# Patient Record
Sex: MALE | Race: WHITE | NOT HISPANIC OR LATINO | Employment: FULL TIME | URBAN - METROPOLITAN AREA
[De-identification: names, ages, dates, MRNs, and addresses within clinical notes are randomized per-mention and may not be internally consistent; named-entity substitution may affect disease eponyms.]

---

## 2021-03-03 DIAGNOSIS — Z23 NEED FOR VACCINATION: ICD-10-CM

## 2022-07-23 ENCOUNTER — OFFICE VISIT (OUTPATIENT)
Dept: URGENT CARE | Facility: CLINIC | Age: 72
End: 2022-07-23
Payer: COMMERCIAL

## 2022-07-23 VITALS
HEIGHT: 78 IN | SYSTOLIC BLOOD PRESSURE: 156 MMHG | TEMPERATURE: 97.7 F | WEIGHT: 261.6 LBS | RESPIRATION RATE: 16 BRPM | BODY MASS INDEX: 30.27 KG/M2 | OXYGEN SATURATION: 97 % | DIASTOLIC BLOOD PRESSURE: 74 MMHG | HEART RATE: 74 BPM

## 2022-07-23 DIAGNOSIS — S61.217A LACERATION OF LEFT LITTLE FINGER WITHOUT FOREIGN BODY WITHOUT DAMAGE TO NAIL, INITIAL ENCOUNTER: ICD-10-CM

## 2022-07-23 PROCEDURE — 12001 RPR S/N/AX/GEN/TRNK 2.5CM/<: CPT | Performed by: PHYSICIAN ASSISTANT

## 2022-07-23 PROCEDURE — 90714 TD VACC NO PRESV 7 YRS+ IM: CPT | Performed by: PHYSICIAN ASSISTANT

## 2022-07-23 PROCEDURE — 90471 IMMUNIZATION ADMIN: CPT | Performed by: PHYSICIAN ASSISTANT

## 2022-07-23 RX ORDER — LOSARTAN POTASSIUM AND HYDROCHLOROTHIAZIDE 12.5; 1 MG/1; MG/1
1 TABLET ORAL
COMMUNITY
Start: 2022-07-13

## 2022-07-23 RX ORDER — UBIDECARENONE 100 MG
CAPSULE ORAL DAILY
COMMUNITY

## 2022-07-23 RX ORDER — FLUTICASONE PROPIONATE 50 MCG
2 SPRAY, SUSPENSION (ML) NASAL DAILY
COMMUNITY
Start: 2022-07-13

## 2022-07-23 ASSESSMENT — FIBROSIS 4 INDEX: FIB4 SCORE: 0.87

## 2022-07-23 NOTE — PROGRESS NOTES
"Subjective:   Aleksandar Thorpe is a 71 y.o. male who presents for Laceration (Pt has a laceration on (L) pinky x today )      HPI  The patient presents to the Urgent Care with a laceration to his left fifth finger onset today.  He was opening up boxes with a clean pocket knife and accidentally cut his left pinky.  There is mild bleeding but controlled with compression.  He is unsure if this needs sutures or glue.  He is not sure if he is up-to-date on his tetanus.  There is pain but no numbness or tingling.  He has full range of motion of his finger.        Medications:    • Coenzyme Q10 Caps  • fluticasone  • losartan-hydrochlorothiazide  • OMEGA-3 FATTY ACIDS PO  • SELENIUM PO  • TURMERIC PO  • VALERIAN ROOT PO  • VITAMIN D (CHOLECALCIFEROL) PO    Allergies: Penicillins    Problem List: Aleksandar Thorpe does not have a problem list on file.    Surgical History:  No past surgical history on file.    Past Social Hx: Aleksandar Thorpe  reports that he has never smoked. He has never used smokeless tobacco. He reports previous alcohol use. He reports previous drug use.     Past Family Hx:  Aleksandar Thorpe family history is not on file.     Problem list, medications, and allergies reviewed by myself today in Epic.     Objective:     BP (!) 156/74 (BP Location: Right arm, Patient Position: Sitting, BP Cuff Size: Large adult)   Pulse 74   Temp 36.5 °C (97.7 °F) (Temporal)   Resp 16   Ht 1.981 m (6' 6\")   Wt 119 kg (261 lb 9.6 oz)   SpO2 97%   BMI 30.23 kg/m²     Physical Exam  Vitals reviewed.   Constitutional:       General: He is not in acute distress.     Appearance: Normal appearance. He is not ill-appearing or toxic-appearing.   Eyes:      Conjunctiva/sclera: Conjunctivae normal.      Pupils: Pupils are equal, round, and reactive to light.   Cardiovascular:      Rate and Rhythm: Normal rate.   Pulmonary:      Effort: Pulmonary effort is normal.   Musculoskeletal:        Hands:       Cervical back: " Neck supple.      Comments: Left 5th finger: Full ROM. Well approximated 1 cm linear superficial laceration not extending through dermis layer to the lateral 5th finger. No tendon involvement. Sensation intact. Cap refill < 2 sec.   Some gaping with finger flexion.    Skin:     General: Skin is warm.   Neurological:      General: No focal deficit present.      Mental Status: He is alert and oriented to person, place, and time.   Psychiatric:         Mood and Affect: Mood normal.         Behavior: Behavior normal.       Laceration Repair Procedure    Risks including bleeding, nerve damage, infection, and poor cosmetic outcome discussed. Benefits and alternative discussed.     Indication: Laceration    Location/Description: Left 5th finger     Procedure: The patient was placed in the appropriate position and anesthesia around the laceration was obtained by infiltration using 1% Lidocaine without epinephrine. The area was then cleansed with thorough irrigation of NS and Hibiclens, betadine and draped in a sterile fashion. The laceration was closed with 5-0 Ethilon using interrupted sutures. There were no additional lacerations requiring repair. The wound area was then dressed with bacitracin.      Total repaired wound length: 1 cm.     Other Items: Suture count: 1    The patient tolerated the procedure well.    Complications: None    Diagnosis and associated orders:     1. Laceration of left little finger without foreign body without damage to nail, initial encounter    - TD Preservative Free =>9yo IM     Comments/MDM:     • Lac repair with one suture as above.   • Wound dressed with xeroform and nonstick dressing.   • Protect finger.   • Tetanus updated today.   • Keep area dry for 24 hours. Then gentle soap and water at least once daily.   • Return in 8 days for suture removal.   • Return earlier if any signs of infection as discussed.        I personally reviewed prior external notes and test results pertinent to  today's visit. Supportive care, natural history, differential diagnoses, and indications for immediate follow-up discussed. Patient expresses understanding and agrees to plan. Patient denies any other questions or concerns.       Please note that this dictation was created using voice recognition software. I have made a reasonable attempt to correct obvious errors, but I expect that there are errors of grammar and possibly content that I did not discover before finalizing the note.    This note was electronically signed by Jose Gao PA-C